# Patient Record
Sex: FEMALE | Race: WHITE | NOT HISPANIC OR LATINO | Employment: UNEMPLOYED | URBAN - NONMETROPOLITAN AREA
[De-identification: names, ages, dates, MRNs, and addresses within clinical notes are randomized per-mention and may not be internally consistent; named-entity substitution may affect disease eponyms.]

---
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
## 2023-06-01 ENCOUNTER — OFFICE VISIT (OUTPATIENT)
Dept: PRIMARY CARE CLINIC | Facility: CLINIC | Age: 18
End: 2023-06-01
Payer: MEDICAID

## 2023-06-01 VITALS
HEART RATE: 71 BPM | DIASTOLIC BLOOD PRESSURE: 75 MMHG | WEIGHT: 250 LBS | SYSTOLIC BLOOD PRESSURE: 117 MMHG | RESPIRATION RATE: 16 BRPM | TEMPERATURE: 98 F | HEIGHT: 66 IN | BODY MASS INDEX: 40.18 KG/M2 | OXYGEN SATURATION: 96 %

## 2023-06-01 DIAGNOSIS — F39 MOOD DISORDER: ICD-10-CM

## 2023-06-01 DIAGNOSIS — Z00.129 ENCOUNTER FOR ROUTINE CHILD HEALTH EXAMINATION WITHOUT ABNORMAL FINDINGS: ICD-10-CM

## 2023-06-01 DIAGNOSIS — F90.0 ATTENTION DEFICIT HYPERACTIVITY DISORDER (ADHD), PREDOMINANTLY INATTENTIVE TYPE: ICD-10-CM

## 2023-06-01 DIAGNOSIS — F33.0 MILD EPISODE OF RECURRENT MAJOR DEPRESSIVE DISORDER: ICD-10-CM

## 2023-06-01 DIAGNOSIS — I10 PRIMARY HYPERTENSION: Primary | ICD-10-CM

## 2023-06-01 DIAGNOSIS — Z30.41 USES ORAL CONTRACEPTION: ICD-10-CM

## 2023-06-01 DIAGNOSIS — E78.5 DYSLIPIDEMIA: ICD-10-CM

## 2023-06-01 DIAGNOSIS — E66.01 CLASS 2 SEVERE OBESITY WITH SERIOUS COMORBIDITY AND BODY MASS INDEX (BMI) OF 37.0 TO 37.9 IN ADULT, UNSPECIFIED OBESITY TYPE: ICD-10-CM

## 2023-06-01 LAB
ALBUMIN SERPL BCP-MCNC: 3.8 G/DL (ref 3.5–5.2)
ALP SERPL-CCNC: 60 U/L (ref 55–135)
ALT SERPL W/O P-5'-P-CCNC: 20 U/L (ref 10–44)
ANION GAP SERPL CALC-SCNC: 3 MMOL/L (ref 8–16)
AST SERPL-CCNC: 10 U/L (ref 10–40)
BASOPHILS # BLD AUTO: 0.06 K/UL (ref 0–0.2)
BASOPHILS NFR BLD: 0.8 % (ref 0–1.9)
BILIRUB SERPL-MCNC: 0.2 MG/DL (ref 0.1–1)
BUN SERPL-MCNC: 17 MG/DL (ref 6–20)
CALCIUM SERPL-MCNC: 9 MG/DL (ref 8.7–10.5)
CHLORIDE SERPL-SCNC: 110 MMOL/L (ref 95–110)
CHOLEST SERPL-MCNC: 158 MG/DL (ref 120–199)
CHOLEST/HDLC SERPL: 3.2 {RATIO} (ref 2–5)
CO2 SERPL-SCNC: 27 MMOL/L (ref 23–29)
CREAT SERPL-MCNC: 0.7 MG/DL (ref 0.5–1.4)
DIFFERENTIAL METHOD: NORMAL
EOSINOPHIL # BLD AUTO: 0.2 K/UL (ref 0–0.5)
EOSINOPHIL NFR BLD: 1.9 % (ref 0–8)
ERYTHROCYTE [DISTWIDTH] IN BLOOD BY AUTOMATED COUNT: 13.2 % (ref 11.5–14.5)
EST. GFR  (NO RACE VARIABLE): >60 ML/MIN/1.73 M^2
ESTIMATED AVG GLUCOSE: 100 MG/DL (ref 68–131)
GLUCOSE SERPL-MCNC: 91 MG/DL (ref 70–110)
HBA1C MFR BLD: 5.1 % (ref 4–5.6)
HCT VFR BLD AUTO: 39 % (ref 37–48.5)
HDLC SERPL-MCNC: 49 MG/DL (ref 40–75)
HDLC SERPL: 31 % (ref 20–50)
HGB BLD-MCNC: 12.8 G/DL (ref 12–16)
IMM GRANULOCYTES # BLD AUTO: 0.03 K/UL (ref 0–0.04)
IMM GRANULOCYTES NFR BLD AUTO: 0.4 % (ref 0–0.5)
LDLC SERPL CALC-MCNC: 81.2 MG/DL (ref 63–159)
LYMPHOCYTES # BLD AUTO: 2.1 K/UL (ref 1–4.8)
LYMPHOCYTES NFR BLD: 27.6 % (ref 18–48)
MCH RBC QN AUTO: 29.1 PG (ref 27–31)
MCHC RBC AUTO-ENTMCNC: 32.8 G/DL (ref 32–36)
MCV RBC AUTO: 89 FL (ref 82–98)
MONOCYTES # BLD AUTO: 0.7 K/UL (ref 0.3–1)
MONOCYTES NFR BLD: 9.1 % (ref 4–15)
NEUTROPHILS # BLD AUTO: 4.6 K/UL (ref 1.8–7.7)
NEUTROPHILS NFR BLD: 60.2 % (ref 38–73)
NONHDLC SERPL-MCNC: 109 MG/DL
NRBC BLD-RTO: 0 /100 WBC
PLATELET # BLD AUTO: 317 K/UL (ref 150–450)
PMV BLD AUTO: 9.6 FL (ref 9.2–12.9)
POTASSIUM SERPL-SCNC: 3.9 MMOL/L (ref 3.5–5.1)
PROT SERPL-MCNC: 6.9 G/DL (ref 6–8.4)
RBC # BLD AUTO: 4.4 M/UL (ref 4–5.4)
SODIUM SERPL-SCNC: 140 MMOL/L (ref 136–145)
TRIGL SERPL-MCNC: 139 MG/DL (ref 30–150)
TSH SERPL DL<=0.005 MIU/L-ACNC: 2.3 UIU/ML (ref 0.4–4)
WBC # BLD AUTO: 7.71 K/UL (ref 3.9–12.7)

## 2023-06-01 PROCEDURE — 3074F SYST BP LT 130 MM HG: CPT | Mod: CPTII,,, | Performed by: STUDENT IN AN ORGANIZED HEALTH CARE EDUCATION/TRAINING PROGRAM

## 2023-06-01 PROCEDURE — 3008F BODY MASS INDEX DOCD: CPT | Mod: CPTII,,, | Performed by: STUDENT IN AN ORGANIZED HEALTH CARE EDUCATION/TRAINING PROGRAM

## 2023-06-01 PROCEDURE — 3078F PR MOST RECENT DIASTOLIC BLOOD PRESSURE < 80 MM HG: ICD-10-PCS | Mod: CPTII,,, | Performed by: STUDENT IN AN ORGANIZED HEALTH CARE EDUCATION/TRAINING PROGRAM

## 2023-06-01 PROCEDURE — 1159F PR MEDICATION LIST DOCUMENTED IN MEDICAL RECORD: ICD-10-PCS | Mod: CPTII,,, | Performed by: STUDENT IN AN ORGANIZED HEALTH CARE EDUCATION/TRAINING PROGRAM

## 2023-06-01 PROCEDURE — 1159F MED LIST DOCD IN RCRD: CPT | Mod: CPTII,,, | Performed by: STUDENT IN AN ORGANIZED HEALTH CARE EDUCATION/TRAINING PROGRAM

## 2023-06-01 PROCEDURE — 99214 PR OFFICE/OUTPT VISIT, EST, LEVL IV, 30-39 MIN: ICD-10-PCS | Mod: S$PBB,,, | Performed by: STUDENT IN AN ORGANIZED HEALTH CARE EDUCATION/TRAINING PROGRAM

## 2023-06-01 PROCEDURE — 99999 PR PBB SHADOW E&M-EST. PATIENT-LVL III: ICD-10-PCS | Mod: PBBFAC,,, | Performed by: STUDENT IN AN ORGANIZED HEALTH CARE EDUCATION/TRAINING PROGRAM

## 2023-06-01 PROCEDURE — 3044F HG A1C LEVEL LT 7.0%: CPT | Mod: CPTII,,, | Performed by: STUDENT IN AN ORGANIZED HEALTH CARE EDUCATION/TRAINING PROGRAM

## 2023-06-01 PROCEDURE — 84443 ASSAY THYROID STIM HORMONE: CPT | Performed by: STUDENT IN AN ORGANIZED HEALTH CARE EDUCATION/TRAINING PROGRAM

## 2023-06-01 PROCEDURE — 1160F PR REVIEW ALL MEDS BY PRESCRIBER/CLIN PHARMACIST DOCUMENTED: ICD-10-PCS | Mod: CPTII,,, | Performed by: STUDENT IN AN ORGANIZED HEALTH CARE EDUCATION/TRAINING PROGRAM

## 2023-06-01 PROCEDURE — 85025 COMPLETE CBC W/AUTO DIFF WBC: CPT | Performed by: STUDENT IN AN ORGANIZED HEALTH CARE EDUCATION/TRAINING PROGRAM

## 2023-06-01 PROCEDURE — 1160F RVW MEDS BY RX/DR IN RCRD: CPT | Mod: CPTII,,, | Performed by: STUDENT IN AN ORGANIZED HEALTH CARE EDUCATION/TRAINING PROGRAM

## 2023-06-01 PROCEDURE — 3044F PR MOST RECENT HEMOGLOBIN A1C LEVEL <7.0%: ICD-10-PCS | Mod: CPTII,,, | Performed by: STUDENT IN AN ORGANIZED HEALTH CARE EDUCATION/TRAINING PROGRAM

## 2023-06-01 PROCEDURE — 80061 LIPID PANEL: CPT | Performed by: STUDENT IN AN ORGANIZED HEALTH CARE EDUCATION/TRAINING PROGRAM

## 2023-06-01 PROCEDURE — 3074F PR MOST RECENT SYSTOLIC BLOOD PRESSURE < 130 MM HG: ICD-10-PCS | Mod: CPTII,,, | Performed by: STUDENT IN AN ORGANIZED HEALTH CARE EDUCATION/TRAINING PROGRAM

## 2023-06-01 PROCEDURE — 99214 OFFICE O/P EST MOD 30 MIN: CPT | Mod: S$PBB,,, | Performed by: STUDENT IN AN ORGANIZED HEALTH CARE EDUCATION/TRAINING PROGRAM

## 2023-06-01 PROCEDURE — 3008F PR BODY MASS INDEX (BMI) DOCUMENTED: ICD-10-PCS | Mod: CPTII,,, | Performed by: STUDENT IN AN ORGANIZED HEALTH CARE EDUCATION/TRAINING PROGRAM

## 2023-06-01 PROCEDURE — 3078F DIAST BP <80 MM HG: CPT | Mod: CPTII,,, | Performed by: STUDENT IN AN ORGANIZED HEALTH CARE EDUCATION/TRAINING PROGRAM

## 2023-06-01 PROCEDURE — 80053 COMPREHEN METABOLIC PANEL: CPT | Performed by: STUDENT IN AN ORGANIZED HEALTH CARE EDUCATION/TRAINING PROGRAM

## 2023-06-01 PROCEDURE — 99999 PR PBB SHADOW E&M-EST. PATIENT-LVL III: CPT | Mod: PBBFAC,,, | Performed by: STUDENT IN AN ORGANIZED HEALTH CARE EDUCATION/TRAINING PROGRAM

## 2023-06-01 PROCEDURE — 99213 OFFICE O/P EST LOW 20 MIN: CPT | Mod: PBBFAC,PN | Performed by: STUDENT IN AN ORGANIZED HEALTH CARE EDUCATION/TRAINING PROGRAM

## 2023-06-01 PROCEDURE — 83036 HEMOGLOBIN GLYCOSYLATED A1C: CPT | Performed by: STUDENT IN AN ORGANIZED HEALTH CARE EDUCATION/TRAINING PROGRAM

## 2023-06-01 RX ORDER — ARIPIPRAZOLE 5 MG/1
5 TABLET ORAL DAILY
Qty: 30 TABLET | Refills: 11 | Status: SHIPPED | OUTPATIENT
Start: 2023-06-01 | End: 2024-05-31

## 2023-06-01 RX ORDER — VILOXAZINE HYDROCHLORIDE 100 MG/1
1 CAPSULE, EXTENDED RELEASE ORAL 2 TIMES DAILY
Qty: 60 CAPSULE | Refills: 11 | Status: SHIPPED | OUTPATIENT
Start: 2023-06-01 | End: 2023-11-28

## 2023-06-01 RX ORDER — AMITRIPTYLINE HYDROCHLORIDE 50 MG/1
50 TABLET, FILM COATED ORAL NIGHTLY
Qty: 30 TABLET | Refills: 2 | Status: SHIPPED | OUTPATIENT
Start: 2023-06-01 | End: 2023-08-30

## 2023-06-01 RX ORDER — SERTRALINE HYDROCHLORIDE 50 MG/1
50 TABLET, FILM COATED ORAL DAILY
Qty: 30 TABLET | Refills: 11 | Status: SHIPPED | OUTPATIENT
Start: 2023-06-01 | End: 2024-05-31

## 2023-06-01 RX ORDER — DROSPIRENONE AND ETHINYL ESTRADIOL 0.03MG-3MG
1 KIT ORAL DAILY
Qty: 90 TABLET | Refills: 1 | Status: SHIPPED | OUTPATIENT
Start: 2023-06-01 | End: 2023-11-28

## 2023-06-01 RX ORDER — METOPROLOL TARTRATE 50 MG/1
50 TABLET ORAL 2 TIMES DAILY
Qty: 180 TABLET | Refills: 1 | Status: SHIPPED | OUTPATIENT
Start: 2023-06-01 | End: 2023-11-28

## 2023-06-01 NOTE — PROGRESS NOTES
[facility] Note    HPI:  Tania Pedroza is a 17 y.o. female who presents today for Annual Exam (Patient is moving back to [place], [place], 2023.  And would like to get her medications refilled and possibly transferred to [facility]. )  Denies fever, chills, excessive headaches, mood fluctuations, difficulty breathing, chest pain, palpitations, abdominal pain, nausea, vomiting, diarrhea.     ROS   A review of systems was performed and was negative except as noted above.    I personally reviewed allergies, past medical, surgical, social and family history and updated as appropriate.    Medications:    Current Outpatient Medications:     amitriptyline (ELAVIL) 50 MG tablet, Take 1 tablet (50 mg total) by mouth every evening., Disp: 30 tablet, Rfl: 2    ARIPiprazole (ABILIFY) 5 MG Tab, Take 1 tablet (5 mg total) by mouth once daily., Disp: 30 tablet, Rfl: 11    metoprolol tartrate (LOPRESSOR) 50 MG tablet, Take 1 tablet (50 mg total) by mouth 2 (two) times daily., Disp: 180 tablet, Rfl: 1    QELBREE 100 mg Cp24, Take 1 capsule by mouth 2 (two) times daily., Disp: 60 capsule, Rfl: 11    sertraline (ZOLOFT) 50 MG tablet, Take 1 tablet (50 mg total) by mouth once daily., Disp: 30 tablet, Rfl: 11    DAVID 3-0.03 mg per tablet, Take 1 tablet by mouth once daily., Disp: 90 tablet, Rfl: 1     Health Maintenance:    There is no immunization history on file for this patient.   Health Maintenance   Topic Date Due    Hepatitis C Screening  Never done    HPV Vaccines (1 - 2-dose series) Never done    Chlamydia Screening  Never done    TETANUS VACCINE  Never done    Lipid Panel  Completed     Health Maintenance Topics with due status: Not Due       Topic Last Completion Date    Influenza Vaccine Not Due     Health Maintenance Due   Topic Date Due    Hepatitis C Screening  Never done    Pneumococcal Vaccines (Age 0-64) (1 - PCV) Never done    HPV Vaccines (1 - 2-dose series) Never done    HIV Screening  Never done     "Chlamydia Screening  Never done    TETANUS VACCINE  Never done       PHYSICAL EXAM:  Vitals:    06/01/23 0942   BP: 117/75   BP Location: Right arm   Patient Position: Sitting   BP Method: Large (Automatic)   Pulse: 71   Resp: 16   Temp: 98.3 °F (36.8 °C)   TempSrc: Oral   SpO2: 96%   Weight: 113.4 kg (250 lb)   Height: 5' 6" (1.676 m)     Body mass index is 40.35 kg/m².  Physical Exam  Vitals reviewed.   Constitutional:       General: She is not in acute distress.     Appearance: Normal appearance. She is not ill-appearing, toxic-appearing or diaphoretic.   HENT:      Right Ear: External ear normal.      Left Ear: External ear normal.      Nose: Nose normal. No congestion or rhinorrhea.      Mouth/Throat:      Mouth: Mucous membranes are moist.      Pharynx: Oropharynx is clear. No oropharyngeal exudate or posterior oropharyngeal erythema (mild).   Eyes:      Extraocular Movements: Extraocular movements intact.      Conjunctiva/sclera: Conjunctivae normal.   Cardiovascular:      Rate and Rhythm: Normal rate and regular rhythm.   Pulmonary:      Effort: Pulmonary effort is normal. No respiratory distress.      Breath sounds: No wheezing or rales.   Abdominal:      General: Abdomen is flat. There is no distension.      Palpations: Abdomen is soft.      Tenderness: There is no abdominal tenderness. There is no right CVA tenderness, left CVA tenderness or guarding.   Musculoskeletal:         General: No swelling. Normal range of motion.      Cervical back: Normal range of motion and neck supple. No rigidity.      Right lower leg: No edema.      Left lower leg: No edema.   Lymphadenopathy:      Cervical: No cervical adenopathy.   Skin:     General: Skin is warm and dry.   Neurological:      General: No focal deficit present.      Mental Status: She is alert and oriented to person, place, and time. Mental status is at baseline.      Motor: No weakness.      Gait: Gait normal.   Psychiatric:         Mood and Affect: Mood " normal.         Behavior: Behavior normal.         Thought Content: Thought content normal.         Judgment: Judgment normal.      ASSESSMENT/PLAN:  1. Primary hypertension  -     metoprolol tartrate (LOPRESSOR) 50 MG tablet; Take 1 tablet (50 mg total) by mouth 2 (two) times daily.  Dispense: 180 tablet; Refill: 1  -     CBC Auto Differential; Future; Expected date: 06/01/2023  -     Comprehensive Metabolic Panel; Future; Expected date: 06/01/2023  -     Hemoglobin A1C; Future; Expected date: 06/01/2023  -     TSH; Future; Expected date: 06/01/2023    2. Class 2 severe obesity with serious comorbidity and body mass index (BMI) of 37.0 to 37.9 in adult, unspecified obesity type  Assessment & Plan:  Wt Readings from Last 8 Encounters:   06/01/23 113.4 kg (250 lb) (>99 %, Z= 2.50)*   09/14/22 104.3 kg (230 lb) (99 %, Z= 2.30)*     * Growth percentiles are based on CDC (Girls, 2-20 Years) data.   Recommendations:   Stay physically active. As tolerated alternate resistance training with stretching and cardio. Goal of 150 minutes per week of moderate intensity activity or 7,500 - 10,000 steps per day. Follow the Mediterranean Diet. Include whole fresh fruits, vegetables, olive oil, seeds, nuts, whole grains, cold water fish, salmon, mackerel and lean cuts of meat.  Do not drink sugary/diet carbonated beverages. Decrease portion sizes slightly which will result in an approximately 500-calorie deficit. Avoid fast or fried and processed food, especially canned foods. Avoid refined carbohydrates, white starchy foods, flour, white potato, bread, muffins, and cakes. Consider substituting one meal a day with a meal replacement such as Slim fast, lean cuisine, or weight watcher's. Follow a healthy diet that includes enough calcium, vitamin D and proteins for bone health.        3. Mood disorder  -     amitriptyline (ELAVIL) 50 MG tablet; Take 1 tablet (50 mg total) by mouth every evening.  Dispense: 30 tablet; Refill: 2  -      sertraline (ZOLOFT) 50 MG tablet; Take 1 tablet (50 mg total) by mouth once daily.  Dispense: 30 tablet; Refill: 11  -     ARIPiprazole (ABILIFY) 5 MG Tab; Take 1 tablet (5 mg total) by mouth once daily.  Dispense: 30 tablet; Refill: 11    4. Mild episode of recurrent major depressive disorder  -     amitriptyline (ELAVIL) 50 MG tablet; Take 1 tablet (50 mg total) by mouth every evening.  Dispense: 30 tablet; Refill: 2  -     sertraline (ZOLOFT) 50 MG tablet; Take 1 tablet (50 mg total) by mouth once daily.  Dispense: 30 tablet; Refill: 11  -     ARIPiprazole (ABILIFY) 5 MG Tab; Take 1 tablet (5 mg total) by mouth once daily.  Dispense: 30 tablet; Refill: 11    5. Attention deficit hyperactivity disorder (ADHD), predominantly inattentive type  -     QELBREE 100 mg Cp24; Take 1 capsule by mouth 2 (two) times daily.  Dispense: 60 capsule; Refill: 11    6. Dyslipidemia  -     Lipid Panel; Future; Expected date: 06/01/2023    7. Encounter for routine child health examination without abnormal findings  -     CBC Auto Differential; Future; Expected date: 06/01/2023  -     Comprehensive Metabolic Panel; Future; Expected date: 06/01/2023  -     Hemoglobin A1C; Future; Expected date: 06/01/2023  -     TSH; Future; Expected date: 06/01/2023    8. Uses oral contraception  -     DAVID 3-0.03 mg per tablet; Take 1 tablet by mouth once daily.  Dispense: 90 tablet; Refill: 1        Other than changes above, continue current medications and maintain follow up with specialists.      No follow-ups on file.   Recent Results (from the past 2016 hour(s))   TSH    Collection Time: 06/01/23 10:33 AM   Result Value Ref Range    TSH 2.300 0.400 - 4.000 uIU/mL   Hemoglobin A1C    Collection Time: 06/01/23 10:33 AM   Result Value Ref Range    Hemoglobin A1C 5.1 4.0 - 5.6 %    Estimated Avg Glucose 100 68 - 131 mg/dL   Lipid Panel    Collection Time: 06/01/23 10:33 AM   Result Value Ref Range    Cholesterol 158 120 - 199 mg/dL    Triglycerides  139 30 - 150 mg/dL    HDL 49 40 - 75 mg/dL    LDL Cholesterol 81.2 63.0 - 159.0 mg/dL    HDL/Cholesterol Ratio 31.0 20.0 - 50.0 %    Total Cholesterol/HDL Ratio 3.2 2.0 - 5.0    Non-HDL Cholesterol 109 mg/dL   Comprehensive Metabolic Panel    Collection Time: 06/01/23 10:33 AM   Result Value Ref Range    Sodium 140 136 - 145 mmol/L    Potassium 3.9 3.5 - 5.1 mmol/L    Chloride 110 95 - 110 mmol/L    CO2 27 23 - 29 mmol/L    Glucose 91 70 - 110 mg/dL    BUN 17 6 - 20 mg/dL    Creatinine 0.7 0.5 - 1.4 mg/dL    Calcium 9.0 8.7 - 10.5 mg/dL    Total Protein 6.9 6.0 - 8.4 g/dL    Albumin 3.8 3.5 - 5.2 g/dL    Total Bilirubin 0.2 0.1 - 1.0 mg/dL    Alkaline Phosphatase 60 55 - 135 U/L    AST 10 10 - 40 U/L    ALT 20 10 - 44 U/L    Anion Gap 3 (L) 8 - 16 mmol/L    eGFR >60.0 >60 mL/min/1.73 m^2   CBC Auto Differential    Collection Time: 06/01/23 10:33 AM   Result Value Ref Range    WBC 7.71 3.90 - 12.70 K/uL    RBC 4.40 4.00 - 5.40 M/uL    Hemoglobin 12.8 12.0 - 16.0 g/dL    Hematocrit 39.0 37.0 - 48.5 %    MCV 89 82 - 98 fL    MCH 29.1 27.0 - 31.0 pg    MCHC 32.8 32.0 - 36.0 g/dL    RDW 13.2 11.5 - 14.5 %    Platelets 317 150 - 450 K/uL    MPV 9.6 9.2 - 12.9 fL    Immature Granulocytes 0.4 0.0 - 0.5 %    Gran # (ANC) 4.6 1.8 - 7.7 K/uL    Immature Grans (Abs) 0.03 0.00 - 0.04 K/uL    Lymph # 2.1 1.0 - 4.8 K/uL    Mono # 0.7 0.3 - 1.0 K/uL    Eos # 0.2 0.0 - 0.5 K/uL    Baso # 0.06 0.00 - 0.20 K/uL    nRBC 0 0 /100 WBC    Gran % 60.2 38.0 - 73.0 %    Lymph % 27.6 18.0 - 48.0 %    Mono % 9.1 4.0 - 15.0 %    Eosinophil % 1.9 0.0 - 8.0 %    Basophil % 0.8 0.0 - 1.9 %    Differential Method Automated          Kazumi G Yoshinaga, DO [facility]

## 2023-06-01 NOTE — PROGRESS NOTES
[facility] Note    Chief Complaint      Chief Complaint   Patient presents with    Annual Exam     Patient is moving back to [place], [place], 2023.  And would like to get her medications refilled and possibly transferred to [facility].        History of Present Illness      Tania Pedroza is a 17 y.o. female who presents today for Annual Exam (Patient is moving back to [place], [place], 2023.  And would like to get her medications refilled and possibly transferred to [facility]. )   .    Past Medical History:  Past Medical History:   Diagnosis Date    ADHD (attention deficit hyperactivity disorder)     Anxiety     Depression     Hypertension        Past Surgical History:   has a past surgical history that includes tonsillectomy and adnoidectomy.    Family History:  family history includes Cancer in her father, maternal grandfather, paternal grandfather, and paternal uncle; Diabetes in her father, mother, and sister.     Social History:  Social History     Tobacco Use    Smoking status: Every Day     Types: Vaping with nicotine     Passive exposure: Never    Smokeless tobacco: Never   Substance Use Topics    Alcohol use: Yes     Comment: social    Drug use: Never       I personally reviewed all past medical, surgical, social and family history.    ROS     Medications:  Outpatient Encounter Medications as of 6/1/2023   Medication Sig Dispense Refill    amitriptyline (ELAVIL) 50 MG tablet Take 50 mg by mouth every evening.      ARIPiprazole (ABILIFY) 5 MG Tab Take 5 mg by mouth once daily.      lisinopriL 10 MG tablet Take 10 mg by mouth once daily.      metoprolol tartrate (LOPRESSOR) 50 MG tablet Take 50 mg by mouth 2 (two) times daily.      QELBREE 100 mg Cp24 Take 1 capsule by mouth once daily.      sertraline (ZOLOFT) 50 MG tablet Take 50 mg by mouth once daily.      DAVID 3-0.03 mg per tablet Take 1 tablet by mouth once daily.       No facility-administered encounter medications on file as of  "6/1/2023.       Allergies:  Review of patient's allergies indicates:  No Known Allergies    Health Maintenance:    There is no immunization history on file for this patient.   Health Maintenance   Topic Date Due    Hepatitis C Screening  Never done    Lipid Panel  Never done    HPV Vaccines (1 - 2-dose series) Never done    Chlamydia Screening  Never done    TETANUS VACCINE  Never done     Health Maintenance Topics with due status: Not Due       Topic Last Completion Date    Influenza Vaccine Not Due     Health Maintenance Due   Topic Date Due    Hepatitis C Screening  Never done    Lipid Panel  Never done    HPV Vaccines (1 - 2-dose series) Never done    HIV Screening  Never done    Chlamydia Screening  Never done    TETANUS VACCINE  Never done     Physical Exam      Vital Signs  Temp: 98.3 °F (36.8 °C)  Temp Source: Oral  Pulse: 71  Resp: 16  SpO2: 96 %  BP: 117/75  BP Location: Right arm  Patient Position: Sitting  Pain Score: 0-No pain  Height and Weight  Height: 5' 6" (167.6 cm)  Weight: 113.4 kg (250 lb)  BSA (Calculated - sq m): 2.3 sq meters  BMI (Calculated): 40.4  Weight in (lb) to have BMI = 25: 154.6]    Physical Exam       Assessment/Plan     Tania Pedroza is a 17 y.o.female with:    Problem List Items Addressed This Visit    None      Other than changes above, cont current medications and maintain follow up with specialists.  No follow-ups on file.    No future appointments.    Kazumi G Yoshinaga, DO [facility]                  "

## 2023-06-19 PROBLEM — E78.5 DYSLIPIDEMIA: Status: ACTIVE | Noted: 2023-06-19

## 2023-06-19 PROBLEM — E66.01 CLASS 2 SEVERE OBESITY WITH SERIOUS COMORBIDITY AND BODY MASS INDEX (BMI) OF 37.0 TO 37.9 IN ADULT: Status: ACTIVE | Noted: 2022-09-14

## 2023-06-19 PROBLEM — Z00.129 ENCOUNTER FOR ROUTINE CHILD HEALTH EXAMINATION WITHOUT ABNORMAL FINDINGS: Status: ACTIVE | Noted: 2023-06-19

## 2023-06-19 NOTE — ASSESSMENT & PLAN NOTE
Wt Readings from Last 8 Encounters:   06/01/23 113.4 kg (250 lb) (>99 %, Z= 2.50)*   09/14/22 104.3 kg (230 lb) (99 %, Z= 2.30)*     * Growth percentiles are based on [facility] (Girls, 2-20 Years) data.   Recommendations:   Stay physically active. As tolerated alternate resistance training with stretching and cardio. Goal of 150 minutes per week of moderate intensity activity or 7,500 - 10,000 steps per day. Follow the Mediterranean Diet. Include whole fresh fruits, vegetables, olive oil, seeds, nuts, whole grains, cold water fish, salmon, mackerel and lean cuts of meat.  Do not drink sugary/diet carbonated beverages. Decrease portion sizes slightly which will result in an approximately 500-calorie deficit. Avoid fast or fried and processed food, especially canned foods. Avoid refined carbohydrates, white starchy foods, flour, white potato, bread, muffins, and cakes. Consider substituting one meal a day with a meal replacement such as Slim fast, lean cuisine, or weight watcher's. Follow a healthy diet that includes enough calcium, vitamin D and proteins for bone health.

## 2023-09-18 PROBLEM — Z00.129 ENCOUNTER FOR ROUTINE CHILD HEALTH EXAMINATION WITHOUT ABNORMAL FINDINGS: Status: RESOLVED | Noted: 2023-06-19 | Resolved: 2023-09-18

## 2024-06-17 ENCOUNTER — PATIENT OUTREACH (OUTPATIENT)
Dept: ADMINISTRATIVE | Facility: HOSPITAL | Age: 19
End: 2024-06-17
Payer: MEDICAID